# Patient Record
Sex: MALE | Race: OTHER | Employment: UNEMPLOYED | URBAN - METROPOLITAN AREA
[De-identification: names, ages, dates, MRNs, and addresses within clinical notes are randomized per-mention and may not be internally consistent; named-entity substitution may affect disease eponyms.]

---

## 2023-01-27 ENCOUNTER — HOSPITAL ENCOUNTER (INPATIENT)
Facility: HOSPITAL | Age: 88
LOS: 6 days | Discharge: HOME | End: 2023-02-02
Attending: EMERGENCY MEDICINE | Admitting: INTERNAL MEDICINE
Payer: MEDICARE

## 2023-01-27 ENCOUNTER — APPOINTMENT (OUTPATIENT)
Dept: GENERAL RADIOLOGY | Facility: HOSPITAL | Age: 88
End: 2023-01-27
Payer: MEDICARE

## 2023-01-27 DIAGNOSIS — N17.9 AKI (ACUTE KIDNEY INJURY) (HCC): ICD-10-CM

## 2023-01-27 DIAGNOSIS — E87.0 HYPERNATREMIA: ICD-10-CM

## 2023-01-27 DIAGNOSIS — R77.8 ELEVATED TROPONIN: ICD-10-CM

## 2023-01-27 DIAGNOSIS — N30.01 ACUTE CYSTITIS WITH HEMATURIA: ICD-10-CM

## 2023-01-27 DIAGNOSIS — T68.XXXA HYPOTHERMIA, INITIAL ENCOUNTER: Primary | ICD-10-CM

## 2023-01-27 LAB
ADENOVIRUS PCR:: NOT DETECTED
ALBUMIN SERPL-MCNC: 2.5 G/DL (ref 3.4–5)
ALBUMIN/GLOB SERPL: 0.5 {RATIO} (ref 1–2)
ALP LIVER SERPL-CCNC: 82 U/L
ALT SERPL-CCNC: 20 U/L
ANION GAP SERPL CALC-SCNC: 3 MMOL/L (ref 0–18)
APTT PPP: 47.6 SECONDS (ref 23.3–35.6)
AST SERPL-CCNC: 40 U/L (ref 15–37)
B PARAPERT DNA SPEC QL NAA+PROBE: NOT DETECTED
B PERT DNA SPEC QL NAA+PROBE: NOT DETECTED
BASOPHILS # BLD AUTO: 0.02 X10(3) UL (ref 0–0.2)
BASOPHILS NFR BLD AUTO: 0.5 %
BILIRUB SERPL-MCNC: 0.5 MG/DL (ref 0.1–2)
BILIRUB UR QL: NEGATIVE
BUN BLD-MCNC: 53 MG/DL (ref 7–18)
BUN/CREAT SERPL: 20.2 (ref 10–20)
C PNEUM DNA SPEC QL NAA+PROBE: NOT DETECTED
CALCIUM BLD-MCNC: 9.8 MG/DL (ref 8.5–10.1)
CHLORIDE SERPL-SCNC: 123 MMOL/L (ref 98–112)
CHOLEST SERPL-MCNC: 162 MG/DL (ref ?–200)
CLARITY UR: CLEAR
CO2 SERPL-SCNC: 28 MMOL/L (ref 21–32)
COLOR UR: YELLOW
CORONAVIRUS 229E PCR:: NOT DETECTED
CORONAVIRUS HKU1 PCR:: NOT DETECTED
CORONAVIRUS NL63 PCR:: NOT DETECTED
CORONAVIRUS OC43 PCR:: NOT DETECTED
CREAT BLD-MCNC: 2.63 MG/DL
DEPRECATED RDW RBC AUTO: 58.1 FL (ref 35.1–46.3)
EOSINOPHIL # BLD AUTO: 0.33 X10(3) UL (ref 0–0.7)
EOSINOPHIL NFR BLD AUTO: 7.8 %
ERYTHROCYTE [DISTWIDTH] IN BLOOD BY AUTOMATED COUNT: 16.9 % (ref 11–15)
FLUAV + FLUBV RNA SPEC NAA+PROBE: NEGATIVE
FLUAV + FLUBV RNA SPEC NAA+PROBE: NEGATIVE
FLUAV RNA SPEC QL NAA+PROBE: NOT DETECTED
FLUBV RNA SPEC QL NAA+PROBE: NOT DETECTED
GFR SERPLBLD BASED ON 1.73 SQ M-ARVRAT: 23 ML/MIN/1.73M2 (ref 60–?)
GLOBULIN PLAS-MCNC: 4.9 G/DL (ref 2.8–4.4)
GLUCOSE BLD-MCNC: 94 MG/DL (ref 70–99)
GLUCOSE UR-MCNC: NEGATIVE MG/DL
HCT VFR BLD AUTO: 31 %
HDLC SERPL-MCNC: 58 MG/DL (ref 40–59)
HGB BLD-MCNC: 9.5 G/DL
IMM GRANULOCYTES # BLD AUTO: 0.01 X10(3) UL (ref 0–1)
IMM GRANULOCYTES NFR BLD: 0.2 %
INR BLD: 1.19 (ref 0.85–1.16)
KETONES UR-MCNC: NEGATIVE MG/DL
LACTATE SERPL-SCNC: 0.7 MMOL/L (ref 0.4–2)
LDLC SERPL CALC-MCNC: 91 MG/DL (ref ?–100)
LYMPHOCYTES # BLD AUTO: 1.13 X10(3) UL (ref 1–4)
LYMPHOCYTES NFR BLD AUTO: 26.8 %
MCH RBC QN AUTO: 28.8 PG (ref 26–34)
MCHC RBC AUTO-ENTMCNC: 30.6 G/DL (ref 31–37)
MCV RBC AUTO: 93.9 FL
METAPNEUMOVIRUS PCR:: NOT DETECTED
MONOCYTES # BLD AUTO: 0.26 X10(3) UL (ref 0.1–1)
MONOCYTES NFR BLD AUTO: 6.2 %
MYCOPLASMA PNEUMONIA PCR:: NOT DETECTED
NEUTROPHILS # BLD AUTO: 2.47 X10 (3) UL (ref 1.5–7.7)
NEUTROPHILS # BLD AUTO: 2.47 X10(3) UL (ref 1.5–7.7)
NEUTROPHILS NFR BLD AUTO: 58.5 %
NITRITE UR QL STRIP.AUTO: NEGATIVE
NONHDLC SERPL-MCNC: 104 MG/DL (ref ?–130)
OSMOLALITY SERPL CALC.SUM OF ELEC: 332 MOSM/KG (ref 275–295)
PARAINFLUENZA 1 PCR:: NOT DETECTED
PARAINFLUENZA 2 PCR:: NOT DETECTED
PARAINFLUENZA 3 PCR:: NOT DETECTED
PARAINFLUENZA 4 PCR:: NOT DETECTED
PH UR: 7.5 [PH] (ref 5–8)
PLATELET # BLD AUTO: 143 10(3)UL (ref 150–450)
POTASSIUM SERPL-SCNC: 4.4 MMOL/L (ref 3.5–5.1)
PROT SERPL-MCNC: 7.4 G/DL (ref 6.4–8.2)
PROTHROMBIN TIME: 15 SECONDS (ref 11.6–14.8)
RBC # BLD AUTO: 3.3 X10(6)UL
RHINOVIRUS/ENTERO PCR:: NOT DETECTED
RSV RNA SPEC NAA+PROBE: NEGATIVE
RSV RNA SPEC QL NAA+PROBE: NOT DETECTED
SARS-COV-2 RNA NPH QL NAA+NON-PROBE: NOT DETECTED
SARS-COV-2 RNA RESP QL NAA+PROBE: NOT DETECTED
SODIUM SERPL-SCNC: 154 MMOL/L (ref 136–145)
SP GR UR STRIP: 1.01 (ref 1–1.03)
TRIGL SERPL-MCNC: 69 MG/DL (ref 30–149)
TROPONIN I HIGH SENSITIVITY: 391 NG/L
TROPONIN I HIGH SENSITIVITY: 416 NG/L
TSI SER-ACNC: 3.72 MIU/ML (ref 0.36–3.74)
UROBILINOGEN UR STRIP-ACNC: 0.2
VLDLC SERPL CALC-MCNC: 11 MG/DL (ref 0–30)
WBC # BLD AUTO: 4.2 X10(3) UL (ref 4–11)

## 2023-01-27 PROCEDURE — 71045 X-RAY EXAM CHEST 1 VIEW: CPT | Performed by: EMERGENCY MEDICINE

## 2023-01-27 RX ORDER — ACETAMINOPHEN 325 MG/1
650 TABLET ORAL EVERY 4 HOURS PRN
COMMUNITY

## 2023-01-27 RX ORDER — ASPIRIN 81 MG/1
324 TABLET, CHEWABLE ORAL ONCE
Status: DISCONTINUED | OUTPATIENT
Start: 2023-01-27 | End: 2023-01-27

## 2023-01-27 RX ORDER — ASPIRIN 300 MG/1
300 SUPPOSITORY RECTAL ONCE
Status: COMPLETED | OUTPATIENT
Start: 2023-01-27 | End: 2023-01-27

## 2023-01-27 RX ORDER — MELATONIN
100 DAILY
COMMUNITY

## 2023-01-27 RX ORDER — HEPARIN SODIUM AND DEXTROSE 10000; 5 [USP'U]/100ML; G/100ML
INJECTION INTRAVENOUS CONTINUOUS
Status: DISCONTINUED | OUTPATIENT
Start: 2023-01-28 | End: 2023-01-28

## 2023-01-27 RX ORDER — LATANOPROST 50 UG/ML
SOLUTION/ DROPS OPHTHALMIC NIGHTLY
COMMUNITY

## 2023-01-27 RX ORDER — CARVEDILOL 3.12 MG/1
3.12 TABLET ORAL 2 TIMES DAILY WITH MEALS
COMMUNITY
End: 2023-02-02

## 2023-01-27 RX ORDER — DORZOLAMIDE HYDROCHLORIDE AND TIMOLOL MALEATE 20; 5 MG/ML; MG/ML
1 SOLUTION/ DROPS OPHTHALMIC 2 TIMES DAILY
COMMUNITY

## 2023-01-27 RX ORDER — AMOXICILLIN 250 MG
1 CAPSULE ORAL NIGHTLY
COMMUNITY

## 2023-01-27 RX ORDER — HEPARIN SODIUM AND DEXTROSE 10000; 5 [USP'U]/100ML; G/100ML
12 INJECTION INTRAVENOUS ONCE
Status: COMPLETED | OUTPATIENT
Start: 2023-01-27 | End: 2023-01-28

## 2023-01-27 RX ORDER — TAMSULOSIN HYDROCHLORIDE 0.4 MG/1
0.8 CAPSULE ORAL NIGHTLY
COMMUNITY

## 2023-01-27 RX ORDER — MELATONIN
325
COMMUNITY

## 2023-01-27 RX ORDER — HEPARIN SODIUM 1000 [USP'U]/ML
60 INJECTION, SOLUTION INTRAVENOUS; SUBCUTANEOUS ONCE
Status: COMPLETED | OUTPATIENT
Start: 2023-01-27 | End: 2023-01-27

## 2023-01-27 RX ORDER — FINASTERIDE 5 MG/1
5 TABLET, FILM COATED ORAL DAILY
COMMUNITY

## 2023-01-27 NOTE — ED QUICK NOTES
Primo fit placed to obtain urine. Mistral air blanket (38 degrees C) placed to help bring up core temperature.

## 2023-01-27 NOTE — ED INITIAL ASSESSMENT (HPI)
Pt presents from the [facility] for complaints of failure to thrive.     Pt noted to have coffee ground residual to area below nose. +contracted

## 2023-01-28 ENCOUNTER — APPOINTMENT (OUTPATIENT)
Dept: CV DIAGNOSTICS | Facility: HOSPITAL | Age: 88
End: 2023-01-28
Attending: INTERNAL MEDICINE
Payer: MEDICARE

## 2023-01-28 LAB
APTT PPP: 217.8 SECONDS (ref 23.3–35.6)
APTT PPP: 54.2 SECONDS (ref 23.3–35.6)
ATRIAL RATE: 52 BPM
ATRIAL RATE: 69 BPM
CORTIS SERPL-MCNC: 9.2 UG/DL
P AXIS: 15 DEGREES
P AXIS: 69 DEGREES
P-R INTERVAL: 102 MS
P-R INTERVAL: 152 MS
Q-T INTERVAL: 414 MS
Q-T INTERVAL: 476 MS
QRS DURATION: 66 MS
QRS DURATION: 90 MS
QTC CALCULATION (BEZET): 442 MS
QTC CALCULATION (BEZET): 443 MS
R AXIS: 7 DEGREES
R AXIS: 8 DEGREES
T AXIS: 63 DEGREES
T AXIS: 73 DEGREES
VENTRICULAR RATE: 52 BPM
VENTRICULAR RATE: 69 BPM

## 2023-01-28 PROCEDURE — 93306 TTE W/DOPPLER COMPLETE: CPT | Performed by: INTERNAL MEDICINE

## 2023-01-28 RX ORDER — DORZOLAMIDE HYDROCHLORIDE AND TIMOLOL MALEATE 20; 5 MG/ML; MG/ML
1 SOLUTION/ DROPS OPHTHALMIC 2 TIMES DAILY
Status: DISCONTINUED | OUTPATIENT
Start: 2023-01-28 | End: 2023-02-02

## 2023-01-28 RX ORDER — TAMSULOSIN HYDROCHLORIDE 0.4 MG/1
0.8 CAPSULE ORAL NIGHTLY
Status: DISCONTINUED | OUTPATIENT
Start: 2023-01-28 | End: 2023-02-02

## 2023-01-28 RX ORDER — MELATONIN
100 DAILY
Status: DISCONTINUED | OUTPATIENT
Start: 2023-01-28 | End: 2023-02-02

## 2023-01-28 RX ORDER — DEXTROSE AND SODIUM CHLORIDE 5; .45 G/100ML; G/100ML
INJECTION, SOLUTION INTRAVENOUS CONTINUOUS
Status: DISCONTINUED | OUTPATIENT
Start: 2023-01-28 | End: 2023-01-29

## 2023-01-28 RX ORDER — MELATONIN
2000 DAILY
Status: DISCONTINUED | OUTPATIENT
Start: 2023-01-28 | End: 2023-02-02

## 2023-01-28 RX ORDER — LATANOPROST 50 UG/ML
1 SOLUTION/ DROPS OPHTHALMIC NIGHTLY
Status: DISCONTINUED | OUTPATIENT
Start: 2023-01-28 | End: 2023-02-02

## 2023-01-28 RX ORDER — ACETAMINOPHEN 325 MG/1
650 TABLET ORAL EVERY 4 HOURS PRN
Status: DISCONTINUED | OUTPATIENT
Start: 2023-01-28 | End: 2023-02-02

## 2023-01-28 RX ORDER — MELATONIN
325
Status: DISCONTINUED | OUTPATIENT
Start: 2023-01-28 | End: 2023-02-02

## 2023-01-28 RX ORDER — FINASTERIDE 5 MG/1
5 TABLET, FILM COATED ORAL DAILY
Status: DISCONTINUED | OUTPATIENT
Start: 2023-01-28 | End: 2023-02-02

## 2023-01-28 RX ORDER — SENNA AND DOCUSATE SODIUM 50; 8.6 MG/1; MG/1
1 TABLET, FILM COATED ORAL NIGHTLY
Status: DISCONTINUED | OUTPATIENT
Start: 2023-01-28 | End: 2023-02-02

## 2023-01-28 NOTE — PLAN OF CARE
Notified by Er provider: new consult  Elevated trop, on heparin gtt. Awaiting second trop. EKG, labs reviewed.     Full consult to follow

## 2023-01-28 NOTE — PROGRESS NOTES
79 y/o male hx dementia  presents from the [facility] with failure to thrive, hypothermia temp 90.2, troponin 416. Now warmed to 97. EKG has J point elevation secondary to hypothermia. History unobtainable. Would check thyroid and cortisol. Correct BUN 53, creat 2.6, Na 154 with fluids. HgB 9.4, may decrease as patient hydrated. Echo today. Doubt primary cardiac event.

## 2023-01-28 NOTE — PHYSICAL THERAPY NOTE
PT order received, chart review complete. Pt seen sleeping in bed with [employer]. Attempted to wake pt however pt remained lethargic, non-verbal, not following command, and kept eyes closed. Will follow up tomorrow for PT evaluation as appropriate and able. RN was made aware.

## 2023-01-28 NOTE — ED QUICK NOTES
This RN and PCT transported patient to floor. Handoff given at the bedside. Family at bedside. Brought to floor with belongings. No issues during transport.

## 2023-01-28 NOTE — CONSULTS
[facility]    PATIENT'S NAME: Madhavi Jenkins   ATTENDING PHYSICIAN: Augustine Ferrell MD   CONSULTING PHYSICIAN: Franco Zamora MD   PATIENT ACCOUNT#:   [de-identified]    LOCATION:  [address] [place], [place] [address] #:   [id number]       YOB: 1933  ADMISSION DATE:       01/27/2023      CONSULT DATE:  01/28/2023    REPORT OF CONSULTATION    HISTORY OF PRESENT ILLNESS:  An 51-year-old male who was brought from the [facility] with failure to thrive. It almost impossible to get a history. He was brought to the emergency room, temperature was 90. Patient was then warmed, he is now 95. In the emergency room, he had an EKG that had J-point elevation with elevated troponin, BUN, creatinine, and sodium. Most of the history is obtained via the chart. There is a question of he had eaten yesterday. Patient arrives nonverbal.      PAST MEDICAL HISTORY:  Not obtainable. SOCIAL HISTORY:  Patient comes from the [facility]. REVIEW OF SYSTEMS:  Not obtainable. PHYSICAL EXAMINATION:    VITAL SIGNS:  Blood pressure 130/70; pulse 60; initial temperature is 90.2, now up to 97. HEENT:  Pupils reactive. LUNGS:  Rhonchi bilaterally. HEART:  S1, S2. Systolic murmur. ABDOMEN:  Soft. EXTREMITIES:  No clubbing, cyanosis, or edema. NEUROLOGIC:  Does not really respond. LABORATORY DATA:  Troponin 416. Thyroid normal.  Hemoglobin 9.5. EKG:  J-point. IMPRESSION:    1. Hypothermia. At the present time, we will check serum cortisol. His thyroid function is already normal.  Evaluate for any sepsis. Patient is warm without any arrhythmia. His lactic acid on admission was normal.  2.   Elevated troponin. This is most likely could be demand ischemia. We will get echo. Doubt a primary cardiac event. 3.   Anemia. Transfuse as needed. 4.   Chronic kidney disease with elevated BUN and creatinine. We will expect this to improve with fluid resuscitation.     Dictated By Franco Zamora, MD  d: 01/28/2023 05:25:31  t: 01/28/2023 05:28:07  [facility] [id number]  MJG/

## 2023-01-28 NOTE — PROGRESS NOTES
Rec'd patient from ED. Heparin gtt infusing @ 700units/hr. Next PTT @ 0400 as per protocol. Pt able to follow commands and mumbling speech. Temp noted to be 97.4. Feet very dry/flaky/peeling. Cleansed and lotion applied. Conda Danas remains in place. Family @ bedside and all questions answered as able.

## 2023-01-28 NOTE — ED QUICK NOTES
Pt's right upper arm piv noted to have infiltrated s/p ns bolus. Enlargement of right elbow/upper forearm/upper arm. No crepitus noted. Pt denies any acute pain or distress to that extremity. Edmd informed.

## 2023-01-29 LAB
ANION GAP SERPL CALC-SCNC: 1 MMOL/L (ref 0–18)
BUN BLD-MCNC: 60 MG/DL (ref 7–18)
BUN/CREAT SERPL: 18 (ref 10–20)
CALCIUM BLD-MCNC: 8.8 MG/DL (ref 8.5–10.1)
CHLORIDE SERPL-SCNC: 131 MMOL/L (ref 98–112)
CO2 SERPL-SCNC: 26 MMOL/L (ref 21–32)
CREAT BLD-MCNC: 3.33 MG/DL
DEPRECATED RDW RBC AUTO: 57.1 FL (ref 35.1–46.3)
ERYTHROCYTE [DISTWIDTH] IN BLOOD BY AUTOMATED COUNT: 16.8 % (ref 11–15)
GFR SERPLBLD BASED ON 1.73 SQ M-ARVRAT: 17 ML/MIN/1.73M2 (ref 60–?)
GLUCOSE BLD-MCNC: 115 MG/DL (ref 70–99)
HCT VFR BLD AUTO: 27.4 %
HGB BLD-MCNC: 8.4 G/DL
MCH RBC QN AUTO: 28.6 PG (ref 26–34)
MCHC RBC AUTO-ENTMCNC: 30.7 G/DL (ref 31–37)
MCV RBC AUTO: 93.2 FL
OSMOLALITY SERPL CALC.SUM OF ELEC: 344 MOSM/KG (ref 275–295)
PLATELET # BLD AUTO: 172 10(3)UL (ref 150–450)
POTASSIUM SERPL-SCNC: 5 MMOL/L (ref 3.5–5.1)
RBC # BLD AUTO: 2.94 X10(6)UL
SODIUM SERPL-SCNC: 158 MMOL/L (ref 136–145)
WBC # BLD AUTO: 5.6 X10(3) UL (ref 4–11)

## 2023-01-29 PROCEDURE — 99223 1ST HOSP IP/OBS HIGH 75: CPT | Performed by: INTERNAL MEDICINE

## 2023-01-29 RX ORDER — DEXTROSE MONOHYDRATE 50 MG/ML
INJECTION, SOLUTION INTRAVENOUS CONTINUOUS
Status: DISCONTINUED | OUTPATIENT
Start: 2023-01-29 | End: 2023-02-02

## 2023-01-29 NOTE — OCCUPATIONAL THERAPY NOTE
Occupational therapy orders received via functional mobility screening. Rn approved pt participation on thsi date. Pt children in room, reporting pt has been at [facility] since December 2022. Pt typically self feeds, pedals self around in w/c, toilet hygiene in bed level, has had several falls since living there. Pt asleep and difficult to arouse on this date, attempted sternal rub, extra lighting in room. Not appropriate for evaluation this morning. Will continue to follow.      Raquel Jasso OTR/L  [id number]  [facility]

## 2023-01-29 NOTE — PHYSICAL THERAPY NOTE
PT orders received via \"functional mobility screen\". RN cleared pt to participate in PT evaluation this morning. Pt received in bed at start, family at bedside. Family reports pt has been residing at [facility] since Dec 2022. Pt able to self propel wc at baseline and receives assist for transfers. Notes several falls since his admission there. Pt asleep at start, difficult to arouse. Attempted sternal rub and increased lighting but no change. Not appropriate for out of bed mobility with this level of alertness. Will plan to follow up when pt able to participate safely.

## 2023-01-29 NOTE — CM/SW NOTE
TRACY received a phone call from [address]. [phone]. Aileen Hair informed that she is calling in regards to [facility] eligibility decision for emergent care, which has been approved for payment. Referral ID  [id number], Notification ID [id number]. TRACY received fax from Aileen Hair  and will send attachment, via email  to [facility] and [facility]. SW/BLUE to remain available for support and/or discharge planning.      Chapo Nagy, MSW, LSW   x [phone]

## 2023-01-30 ENCOUNTER — APPOINTMENT (OUTPATIENT)
Dept: ULTRASOUND IMAGING | Facility: HOSPITAL | Age: 88
End: 2023-01-30
Attending: INTERNAL MEDICINE
Payer: MEDICARE

## 2023-01-30 LAB
ANION GAP SERPL CALC-SCNC: 2 MMOL/L (ref 0–18)
BASOPHILS # BLD AUTO: 0.02 X10(3) UL (ref 0–0.2)
BASOPHILS # BLD AUTO: 0.02 X10(3) UL (ref 0–0.2)
BASOPHILS NFR BLD AUTO: 0.4 %
BASOPHILS NFR BLD AUTO: 0.4 %
BUN BLD-MCNC: 54 MG/DL (ref 7–18)
BUN/CREAT SERPL: 19.9 (ref 10–20)
CALCIUM BLD-MCNC: 8.8 MG/DL (ref 8.5–10.1)
CHLORIDE SERPL-SCNC: 122 MMOL/L (ref 98–112)
CO2 SERPL-SCNC: 28 MMOL/L (ref 21–32)
CREAT BLD-MCNC: 2.72 MG/DL
DEPRECATED RDW RBC AUTO: 57.1 FL (ref 35.1–46.3)
DEPRECATED RDW RBC AUTO: 57.5 FL (ref 35.1–46.3)
EOSINOPHIL # BLD AUTO: 0.3 X10(3) UL (ref 0–0.7)
EOSINOPHIL # BLD AUTO: 0.31 X10(3) UL (ref 0–0.7)
EOSINOPHIL NFR BLD AUTO: 5.8 %
EOSINOPHIL NFR BLD AUTO: 6.1 %
ERYTHROCYTE [DISTWIDTH] IN BLOOD BY AUTOMATED COUNT: 16.3 % (ref 11–15)
ERYTHROCYTE [DISTWIDTH] IN BLOOD BY AUTOMATED COUNT: 16.3 % (ref 11–15)
GFR SERPLBLD BASED ON 1.73 SQ M-ARVRAT: 22 ML/MIN/1.73M2 (ref 60–?)
GLUCOSE BLD-MCNC: 109 MG/DL (ref 70–99)
HCT VFR BLD AUTO: 23.4 %
HCT VFR BLD AUTO: 26.7 %
HGB BLD-MCNC: 7 G/DL
HGB BLD-MCNC: 8.1 G/DL
IMM GRANULOCYTES # BLD AUTO: 0.02 X10(3) UL (ref 0–1)
IMM GRANULOCYTES # BLD AUTO: 0.03 X10(3) UL (ref 0–1)
IMM GRANULOCYTES NFR BLD: 0.4 %
IMM GRANULOCYTES NFR BLD: 0.6 %
LYMPHOCYTES # BLD AUTO: 0.98 X10(3) UL (ref 1–4)
LYMPHOCYTES # BLD AUTO: 1.06 X10(3) UL (ref 1–4)
LYMPHOCYTES NFR BLD AUTO: 19.8 %
LYMPHOCYTES NFR BLD AUTO: 20 %
MCH RBC QN AUTO: 28.3 PG (ref 26–34)
MCH RBC QN AUTO: 28.6 PG (ref 26–34)
MCHC RBC AUTO-ENTMCNC: 29.9 G/DL (ref 31–37)
MCHC RBC AUTO-ENTMCNC: 30.3 G/DL (ref 31–37)
MCV RBC AUTO: 94.3 FL
MCV RBC AUTO: 94.7 FL
MONOCYTES # BLD AUTO: 0.28 X10(3) UL (ref 0.1–1)
MONOCYTES # BLD AUTO: 0.31 X10(3) UL (ref 0.1–1)
MONOCYTES NFR BLD AUTO: 5.7 %
MONOCYTES NFR BLD AUTO: 5.8 %
NEUTROPHILS # BLD AUTO: 3.3 X10 (3) UL (ref 1.5–7.7)
NEUTROPHILS # BLD AUTO: 3.3 X10(3) UL (ref 1.5–7.7)
NEUTROPHILS # BLD AUTO: 3.63 X10 (3) UL (ref 1.5–7.7)
NEUTROPHILS # BLD AUTO: 3.63 X10(3) UL (ref 1.5–7.7)
NEUTROPHILS NFR BLD AUTO: 67.2 %
NEUTROPHILS NFR BLD AUTO: 67.8 %
OSMOLALITY SERPL CALC.SUM OF ELEC: 329 MOSM/KG (ref 275–295)
PLATELET # BLD AUTO: 123 10(3)UL (ref 150–450)
PLATELET # BLD AUTO: 136 10(3)UL (ref 150–450)
POTASSIUM SERPL-SCNC: 5 MMOL/L (ref 3.5–5.1)
RBC # BLD AUTO: 2.47 X10(6)UL
RBC # BLD AUTO: 2.83 X10(6)UL
SODIUM SERPL-SCNC: 152 MMOL/L (ref 136–145)
WBC # BLD AUTO: 4.9 X10(3) UL (ref 4–11)
WBC # BLD AUTO: 5.4 X10(3) UL (ref 4–11)

## 2023-01-30 PROCEDURE — 76770 US EXAM ABDO BACK WALL COMP: CPT | Performed by: INTERNAL MEDICINE

## 2023-01-30 PROCEDURE — 99233 SBSQ HOSP IP/OBS HIGH 50: CPT | Performed by: INTERNAL MEDICINE

## 2023-01-30 NOTE — CM/SW NOTE
Per chart review, pt is from [address]. Sw received MDO stating pt will likely be ready to return today. SW sent return referral.     PLAN: pending medical course - return to [facility] remains available for support and/or discharge planning. Please do not hesitate to call/chat SW if further DC needs arise.      Gurwinder Richardson MSW, [place], [place]   Ext [phone]

## 2023-01-30 NOTE — PHYSICAL THERAPY NOTE
Pt seen resting in bed with dtr present in room. Pt declined participation with therapy despite education and encouragement. Per pt's dtr, pt is WC bound and dependent for transfers bed<>WC. Able to self propel in [facility] but does not receive therapy at [facility] due to dementia and being uncooperative with therapists. Pt's dtr stated the plan is to take pt home to family's house where he will have 24 hour care. Will follow up as appropriate and able.

## 2023-01-30 NOTE — CM/SW NOTE
TRACY met with pt's 2 daughters at bedside: Janice and Effie Oskar. Pt presents from the [facility]. Per Keenan Polanco, pt admitted to [facility] on 12/7/2022 from [facility]. Daughters confirmed this. Daughters noted that pt is primarily w/c bound and requires assistance for transfers. Pt's daughters report that pt had an extensive hospitalization at [facility] from October 2022 until he discharged to [facility] in the beginning of December 2022. Daughters report that pt was living at home with family prior to pt's hospitalization in October 2022. Family does not wish for pt to return to the [facility]. They were not pleased with the care received during his stay. Daughters would like to make arrangements to bring pt to their brother Praneeth's home:  [address]    Family report they will provide 24h care for pt. Pt will be able to remain on 1st level of son's home. Family interested in home health and DME. TRACY called pt's Yolanda  p: [phone]. TRACY left VM. Carle Denver is currently out of the office (until unknown). Her VM [facility] [facility]) p: [phone]. TRACY left  for [place]. @3PM  TRACY received call back from Nicanor Torres. Per Carle Denver - she is not pt's [address] states she will \"do some research\" to find out who his [facility] SW is and call this writer back. Carle Denver states she will likely not call writer back until tomorrow 1/31    PLAN: pending medical course & [facility] home health/DME arrangements - family would like to bring pt home  - declining return to [address] [place] [address] remains available for support and/or discharge planning. Please do not hesitate to call/chat TRACY if further DC needs arise.      Nisreen Alfred [place], [place]   Ext [phone]

## 2023-01-30 NOTE — CM/SW NOTE
Department  notified of request for [facility] referrals started. Assigned CM/SW to follow up with pt/family on further discharge planning.      Yohana Chowdary   January 30, 2023   09:32

## 2023-01-30 NOTE — OCCUPATIONAL THERAPY NOTE
Attempted OT evaluation. Per daughter, pt is w/c bound at [facility] requiring dependent assist for ADLs and functional transfers due to dementia. Pt does not receive therapy services as he is uncooperative. Pt declined OT/PT attempt and daughter requested therapy to return at a later time.     Gabriella Quarles, OT

## 2023-01-31 LAB
ANION GAP SERPL CALC-SCNC: 0 MMOL/L (ref 0–18)
BUN BLD-MCNC: 57 MG/DL (ref 7–18)
BUN/CREAT SERPL: 23.5 (ref 10–20)
CALCIUM BLD-MCNC: 8.6 MG/DL (ref 8.5–10.1)
CHLORIDE SERPL-SCNC: 117 MMOL/L (ref 98–112)
CO2 SERPL-SCNC: 26 MMOL/L (ref 21–32)
CREAT BLD-MCNC: 2.43 MG/DL
CREAT UR-SCNC: 55.9 MG/DL
CREAT UR-SCNC: 55.9 MG/DL
DEPRECATED RDW RBC AUTO: 53.2 FL (ref 35.1–46.3)
ERYTHROCYTE [DISTWIDTH] IN BLOOD BY AUTOMATED COUNT: 15.9 % (ref 11–15)
GFR SERPLBLD BASED ON 1.73 SQ M-ARVRAT: 25 ML/MIN/1.73M2 (ref 60–?)
GLUCOSE BLD-MCNC: 73 MG/DL (ref 70–99)
HCT VFR BLD AUTO: 25.8 %
HGB BLD-MCNC: 8.1 G/DL
IRON SATN MFR SERPL: 32 %
IRON SERPL-MCNC: 56 UG/DL
MAGNESIUM SERPL-MCNC: 2 MG/DL (ref 1.6–2.6)
MCH RBC QN AUTO: 28.7 PG (ref 26–34)
MCHC RBC AUTO-ENTMCNC: 31.4 G/DL (ref 31–37)
MCV RBC AUTO: 91.5 FL
MICROALBUMIN UR-MCNC: 13.7 MG/DL
MICROALBUMIN/CREAT 24H UR-RTO: 245.1 UG/MG (ref ?–30)
OSMOLALITY SERPL CALC.SUM OF ELEC: 310 MOSM/KG (ref 275–295)
PLATELET # BLD AUTO: 147 10(3)UL (ref 150–450)
POTASSIUM SERPL-SCNC: 5.1 MMOL/L (ref 3.5–5.1)
RBC # BLD AUTO: 2.82 X10(6)UL
SODIUM SERPL-SCNC: 143 MMOL/L (ref 136–145)
SODIUM SERPL-SCNC: 52 MMOL/L
TIBC SERPL-MCNC: 174 UG/DL (ref 240–450)
TRANSFERRIN SERPL-MCNC: 117 MG/DL (ref 200–360)
WBC # BLD AUTO: 6.1 X10(3) UL (ref 4–11)

## 2023-01-31 PROCEDURE — 99233 SBSQ HOSP IP/OBS HIGH 50: CPT | Performed by: INTERNAL MEDICINE

## 2023-01-31 RX ORDER — LIDOCAINE HYDROCHLORIDE 20 MG/ML
10 JELLY TOPICAL AS NEEDED
Status: DISCONTINUED | OUTPATIENT
Start: 2023-01-31 | End: 2023-02-02

## 2023-01-31 RX ORDER — CEFTRIAXONE 1 G/1
1 INJECTION, POWDER, FOR SOLUTION INTRAMUSCULAR; INTRAVENOUS EVERY 24 HOURS
Status: DISCONTINUED | OUTPATIENT
Start: 2023-01-31 | End: 2023-01-31

## 2023-01-31 NOTE — CM/SW NOTE
Department  notified of request for DME, aidin referrals started. Assigned CM/SW to follow up with pt/family on further discharge planning.      Ellie Nath   January 31, 2023   09:44

## 2023-01-31 NOTE — PROGRESS NOTES
[facility]    Progress Note    David Merrill Patient Status:  Inpatient    1933 MRN [id number]   Location [facility] 2W/SW Attending Tess Freitas MD   Hosp Day # 3 PCP No primary care provider on file. Subjective:    David Merrill is a(n) 80year old male     ROS:     Constitutional:  Negative for decreased activity, fever, irritability and lethargy  ENMT:  Negative for ear drainage, hearing loss and nasal drainage  Eyes:  Negative for eye discharge and vision loss  Cardiovascular:  Negative for chest pain, sob, irregular heartbeat/palpitations  Respiratory:  Negative for cough, dyspnea and wheezing  Gastrointestinal:  Negative for abdominal pain, constipation, decreased appetite, diarrhea and vomiting  Genitourinary:  Negative for dysuria and hematuria  Endocrine:  Negative for abnormal sleep patterns, increased activity, polydipsia and polyphagia  Hema/Lymph:  Negative for easy bleeding and easy bruising  Integumentary:  Negative for pruritus and rash  Musculoskeletal:  Negative for bone/joint symptoms  Neurological: More alert today  Psychiatric:  Negative for inappropriate interaction and psychiatric symptoms       23  1200   BP: 123/64   Pulse: 66   Resp: 16   Temp:            PHYSICAL EXAM:   Constitutional: appears well hydrated alert and responsive no acute distress noted  Head/Face: normocephalic  Eyes/Vision: normal extraocular motion is intact  Nose/Mouth/Throat:mucous membranes are moist and no oral lesions are noted  Neck/Thyroid: neck is supple without adenopathy  Lymphatic: no abnormal cervical, supraclavicular adenopathy is noted  Respiratory:  lungs are clear to auscultation bilaterally, normal respiratory effort  Cardiovascular: regular rate and rhythm no murmurs, gallups, or rubs  Abdomen: soft, non-tender, non-distended, BS normal  Vascular: well perfused femoral, and pedal pulses normal  Skin/Hair: no unusual rashes present, no abnormal bruising noted  Back/Spine: no abnormalities noted  Musculoskeletal: full ROM all extremities good strength  no deformities  Extremities: Very dry skin poor circulation lower extremities  Neurological: Has dementia but able to answer basic questions    Results:     Laboratory Data:  Lab Results   Component Value Date    WBC 5.4 01/30/2023    HGB 8.1 (L) 01/30/2023    HCT 26.7 (L) 01/30/2023    .0 (L) 01/30/2023    CREATSERUM 2.72 (H) 01/30/2023    BUN 54 (H) 01/30/2023     (H) 01/30/2023    K 5.0 01/30/2023     (H) 01/30/2023    CO2 28.0 01/30/2023     (H) 01/30/2023    CA 8.8 01/30/2023    ALB 2.5 (L) 01/27/2023    ALKPHO 82 01/27/2023    BILT 0.5 01/27/2023    TP 7.4 01/27/2023    AST 40 (H) 01/27/2023    ALT 20 01/27/2023    PTT 54.2 (H) 01/28/2023    INR 1.19 (H) 01/27/2023    TSH 3.720 01/27/2023       Imaging:  [unfilled]   No results found. ASSESSMENT/PLAN:   Assessment       #1 acute renal failure volume depletion  Doing better  Cultures negative    Getting hydrated with D5W sodium has come down   From 160-152  Creatinine down to 2.7 do not really know baseline    Hemoglobin 8.1 most likely multifactorial but will check iron and B12 MCV of 94    Discussed with daughters they do not want him going back to the [facility] so they are looking for other nursing home options  Not a candidate for dialysis continue present plan discussed with nurse Liset Santos  1/30/2023  Alberta Mooney MD    '

## 2023-01-31 NOTE — CM/SW NOTE
HOSPITAL BED ORDER  SW requested MD to co-sign hospital bed order placed by writer this morning. MD will also need to complete the below verbiage in a brief note/progress note:    On (date) I had a face to face encounter with (First, Last Name) for a semi electric hospital bed medical necessity evaluation. Patient has a history of (diagnosis) which would require him/her to have to elevate his/her head 30 degrees or more in order to avoid (symptoms). Patient is bed bound and is not able to reposition him/her self and needs the bed to alleviate pain in his/her (body part). It is in my opinion that a semi-electric hospital bed is reasonable and necessary.       DME referral requested to [facility]  delivery address: [place] [address]. GURMEET, [place], [place]   Ext [phone]

## 2023-01-31 NOTE — CM/SW NOTE
TRACY received call from [address] D:[phone]. TRACY discussed pt's needs for DC. Hung Leone confirmed that he will work on home health aid arrangements for pt. TRACY provided DC address to Hung Leone. William notified TRACY to send [facility] orders to [place]. TRACY faxed clinicals and [facility] request to [place] MD F:[phone] attn Dr. Zain Araujo. Hung Leone reports he will notify VA MD of incoming [facility] order request. Hung Leone directed TRACY to arrange DME thru pt's medicare, as this will be more efficient. TRACY confirmed with pt's dtr dme needs: hospital bed. Hospital bed order details in previous note. HH to be arranged thru [place] as pt's PCP is [place]. PLAN: pending medical course - DC to home to son's house: [address]: pending with VA, orders sent  - need confirmation of arrangements for PT/OT/RN  - William/XIN MOLINA will arrange for [facility] aid    DME: referral started with [facility] (hospital bed)   - need order to be co-signed & MD to enter appropriate verbiage. verbiage sent to MD MOLINA remains available for support and/or discharge planning. Please do not hesitate to call/chat TRACY if further DC needs arise.      Johann LEVI, [place], [place]   Ext [phone]

## 2023-02-01 LAB
ALBUMIN SERPL-MCNC: 2 G/DL (ref 3.4–5)
ALBUMIN/GLOB SERPL: 0.5 {RATIO} (ref 1–2)
ALP LIVER SERPL-CCNC: 72 U/L
ALT SERPL-CCNC: 28 U/L
ANION GAP SERPL CALC-SCNC: 4 MMOL/L (ref 0–18)
AST SERPL-CCNC: 48 U/L (ref 15–37)
BASOPHILS # BLD AUTO: 0.02 X10(3) UL (ref 0–0.2)
BASOPHILS NFR BLD AUTO: 0.4 %
BILIRUB SERPL-MCNC: 0.2 MG/DL (ref 0.1–2)
BUN BLD-MCNC: 58 MG/DL (ref 7–18)
BUN/CREAT SERPL: 25.6 (ref 10–20)
CALCIUM BLD-MCNC: 8.2 MG/DL (ref 8.5–10.1)
CHLORIDE SERPL-SCNC: 114 MMOL/L (ref 98–112)
CO2 SERPL-SCNC: 25 MMOL/L (ref 21–32)
CREAT BLD-MCNC: 2.27 MG/DL
DEPRECATED RDW RBC AUTO: 51.4 FL (ref 35.1–46.3)
EOSINOPHIL # BLD AUTO: 0.33 X10(3) UL (ref 0–0.7)
EOSINOPHIL NFR BLD AUTO: 5.9 %
ERYTHROCYTE [DISTWIDTH] IN BLOOD BY AUTOMATED COUNT: 15.6 % (ref 11–15)
GFR SERPLBLD BASED ON 1.73 SQ M-ARVRAT: 27 ML/MIN/1.73M2 (ref 60–?)
GLOBULIN PLAS-MCNC: 4.2 G/DL (ref 2.8–4.4)
GLUCOSE BLD-MCNC: 74 MG/DL (ref 70–99)
HCT VFR BLD AUTO: 24.5 %
HGB BLD-MCNC: 7.8 G/DL
IMM GRANULOCYTES # BLD AUTO: 0.02 X10(3) UL (ref 0–1)
IMM GRANULOCYTES NFR BLD: 0.4 %
LYMPHOCYTES # BLD AUTO: 0.93 X10(3) UL (ref 1–4)
LYMPHOCYTES NFR BLD AUTO: 16.5 %
MAGNESIUM SERPL-MCNC: 2 MG/DL (ref 1.6–2.6)
MCH RBC QN AUTO: 28.7 PG (ref 26–34)
MCHC RBC AUTO-ENTMCNC: 31.8 G/DL (ref 31–37)
MCV RBC AUTO: 90.1 FL
MONOCYTES # BLD AUTO: 0.38 X10(3) UL (ref 0.1–1)
MONOCYTES NFR BLD AUTO: 6.8 %
NEUTROPHILS # BLD AUTO: 3.94 X10 (3) UL (ref 1.5–7.7)
NEUTROPHILS # BLD AUTO: 3.94 X10(3) UL (ref 1.5–7.7)
NEUTROPHILS NFR BLD AUTO: 70 %
OSMOLALITY SERPL CALC.SUM OF ELEC: 311 MOSM/KG (ref 275–295)
PHOSPHATE SERPL-MCNC: 3.1 MG/DL (ref 2.5–4.9)
PLATELET # BLD AUTO: 142 10(3)UL (ref 150–450)
POTASSIUM SERPL-SCNC: 4.7 MMOL/L (ref 3.5–5.1)
PROT SERPL-MCNC: 6.2 G/DL (ref 6.4–8.2)
RBC # BLD AUTO: 2.72 X10(6)UL
SODIUM SERPL-SCNC: 143 MMOL/L (ref 136–145)
WBC # BLD AUTO: 5.6 X10(3) UL (ref 4–11)

## 2023-02-01 NOTE — DISCHARGE INSTRUCTIONS
Urethral stricture dilated by urology, indwelling catheter placed on 1/31/2022- urine purulent.      VA Outpatient : Bobbi - O:[phone]

## 2023-02-01 NOTE — CDS QUERY
.Potential for Impaired Nutritional Status  DOCUMENTATION CLARIFICATION FORM    A Registered Dietician evaluated this patient and found them to meet Severe Malnutrition Criteria using the [place] Guidelines based off the following clinical indicators:      * BMI 19.72  * Chronic illness related to weight loss greater than 20% in 1 year  * Per physical exam patient noted with severe body fat depletion to orbital, triceps and rib cage region  * Per physical exam patient noted with severe muscle mass depletion to temple, clavicle, scapular, shoulder and thigh region. * Skin breakdown to sacrum and buttocks  * RD recommends adding ensure compact and magic cup chocolate BID. Patient already on iron, thiamin and vitamin D PTA  * Medical History includes CKD, bph, Dementia    Dear Doctor: Cyndy Lucio information suggests potential for impaired nutritional status. For accurate ICD-10-CM code assignment to reflect severity of illness and risk of mortality,  PLEASE (X) ALL DIAGNOSES THAT APPLY. SELECTION BY PHYSICIAN ONLY    ( x)  Severe Protein-Calorie Malnutrition    ( )  Undernutrition    ( )  Other (please specify):     ( )  Unable to Determine (please comment)          If you have any questions, please contact Clinical :  Mark Keane RN at [phone]     Thank You!     THIS FORM IS A PERMANENT PART OF THE MEDICAL RECORD

## 2023-02-02 VITALS
SYSTOLIC BLOOD PRESSURE: 116 MMHG | RESPIRATION RATE: 15 BRPM | OXYGEN SATURATION: 100 % | TEMPERATURE: 97 F | BODY MASS INDEX: 19.76 KG/M2 | HEART RATE: 66 BPM | HEIGHT: 67 IN | WEIGHT: 125.88 LBS | DIASTOLIC BLOOD PRESSURE: 66 MMHG

## 2023-02-02 LAB
ALBUMIN SERPL-MCNC: 2 G/DL (ref 3.4–5)
ANION GAP SERPL CALC-SCNC: 3 MMOL/L (ref 0–18)
BASOPHILS # BLD AUTO: 0.01 X10(3) UL (ref 0–0.2)
BASOPHILS NFR BLD AUTO: 0.2 %
BUN BLD-MCNC: 52 MG/DL (ref 7–18)
BUN/CREAT SERPL: 24.1 (ref 10–20)
CALCIUM BLD-MCNC: 8.1 MG/DL (ref 8.5–10.1)
CHLORIDE SERPL-SCNC: 116 MMOL/L (ref 98–112)
CO2 SERPL-SCNC: 25 MMOL/L (ref 21–32)
CREAT BLD-MCNC: 2.16 MG/DL
DEPRECATED HBV CORE AB SER IA-ACNC: 847.6 NG/ML
DEPRECATED RDW RBC AUTO: 49.8 FL (ref 35.1–46.3)
EOSINOPHIL # BLD AUTO: 0.25 X10(3) UL (ref 0–0.7)
EOSINOPHIL NFR BLD AUTO: 5.4 %
ERYTHROCYTE [DISTWIDTH] IN BLOOD BY AUTOMATED COUNT: 15.8 % (ref 11–15)
GFR SERPLBLD BASED ON 1.73 SQ M-ARVRAT: 29 ML/MIN/1.73M2 (ref 60–?)
GLUCOSE BLD-MCNC: 83 MG/DL (ref 70–99)
HCT VFR BLD AUTO: 24.3 %
HGB BLD-MCNC: 7.8 G/DL
IMM GRANULOCYTES # BLD AUTO: 0.02 X10(3) UL (ref 0–1)
IMM GRANULOCYTES NFR BLD: 0.4 %
LYMPHOCYTES # BLD AUTO: 0.92 X10(3) UL (ref 1–4)
LYMPHOCYTES NFR BLD AUTO: 19.9 %
MCH RBC QN AUTO: 28.4 PG (ref 26–34)
MCHC RBC AUTO-ENTMCNC: 32.1 G/DL (ref 31–37)
MCV RBC AUTO: 88.4 FL
MONOCYTES # BLD AUTO: 0.33 X10(3) UL (ref 0.1–1)
MONOCYTES NFR BLD AUTO: 7.1 %
NEUTROPHILS # BLD AUTO: 3.09 X10 (3) UL (ref 1.5–7.7)
NEUTROPHILS # BLD AUTO: 3.09 X10(3) UL (ref 1.5–7.7)
NEUTROPHILS NFR BLD AUTO: 67 %
OSMOLALITY SERPL CALC.SUM OF ELEC: 311 MOSM/KG (ref 275–295)
PHOSPHATE SERPL-MCNC: 2.8 MG/DL (ref 2.5–4.9)
PLATELET # BLD AUTO: 160 10(3)UL (ref 150–450)
POTASSIUM SERPL-SCNC: 4.5 MMOL/L (ref 3.5–5.1)
RBC # BLD AUTO: 2.75 X10(6)UL
SODIUM SERPL-SCNC: 144 MMOL/L (ref 136–145)
WBC # BLD AUTO: 4.6 X10(3) UL (ref 4–11)

## 2023-02-02 RX ORDER — MIDODRINE HYDROCHLORIDE 5 MG/1
5 TABLET ORAL 3 TIMES DAILY
Status: DISCONTINUED | OUTPATIENT
Start: 2023-02-02 | End: 2023-02-02

## 2023-02-02 RX ORDER — MIDODRINE HYDROCHLORIDE 5 MG/1
5 TABLET ORAL 3 TIMES DAILY
Qty: 60 TABLET | Refills: 0 | Status: SHIPPED | OUTPATIENT
Start: 2023-02-02

## 2023-02-02 NOTE — PROGRESS NOTES
02/02/23 5989   Wound 01/27/23 Pressure Injury Sacrum   Date First Assessed/Time First Assessed: 01/27/23 2300   Present on Hospital Admission: Yes  Primary Wound Type: Pressure Injury  Location: Sacrum   Wound Image    Site Assessment Bleeding;Fragile; Moist;Red;Granulation tissue   Closure Not approximated   Drainage Amount Scant   Drainage Description Sanguineous   Treatments Cleansed; Topical (Barrier/Moisturizer/Ointment)   Dressing Aquacel Foam   Dressing Changed Changed   Dressing Status Dressing Changed;Removed; Old drainage   Wound Length (cm) 2.5 cm   Wound Width (cm) 2 cm   Wound Surface Area (cm^2) 5 cm^2   Wound Depth (cm) 0.1 cm   Wound Volume (cm^3) 0.5 cm^3   Rita-wound Assessment Clean;Fragile; Moist   Wound Granulation Tissue Red   Wound Odor None   Pressure Injury Stage 2   State of Healing Fully granulated   [REMOVED] Wound 01/27/23 Pressure Injury Buttocks Right   Final Assessment Date: 02/02/23  Date First Assessed/Time First Assessed: 01/27/23 2300   Present on Hospital Admission: Yes  Primary Wound Type: Pressure Injury  Location: Buttocks  Wound Location Orientation: Right  Wound Description (Comments): Rig. .. Wound Image    Site Assessment Clean;Dry; Intact;Fragile;Pink;Epithelialization   Closure Approximated   Drainage Amount None   Treatments Cleansed   Dressing Aquacel Foam   Dressing Changed Reinforced   Dressing Status Clean;Dry; Intact   Wound Depth (cm) 0 cm   Non-staged Wound Description Not applicable   Wound Odor None   State of Healing Epithelialized   Wound 02/01/23 Pressure Injury Heel Left;Lateral   Date First Assessed/Time First Assessed: 02/01/23 0900   Present on Hospital Admission: Yes  Primary Wound Type: Pressure Injury  Location: Heel  Wound Location Orientation: Left;Lateral   Wound Image    Site Assessment Black; Brown;Fragile; Moist;Painful   Closure Not approximated   Drainage Amount Scant   Drainage Description Brown;Serosanguineous   Treatments Betadine   Dressing 4x4s;Aquacel Foam;Kerlix roll   Dressing Changed Changed   Dressing Status Dressing Changed;Removed; Old drainage   Wound Length (cm) 4.8 cm   Wound Width (cm) 4.8 cm   Wound Surface Area (cm^2) 23.04 cm^2   Rita-wound Assessment Dry;Fragile   Wound Bed Eschar (%) 100 %   Wound Odor None   Pressure Injury Stage U   State of Healing Eschar   Wound Follow Up   Follow up needed Yes     WOUND CARE NOTE    History:  Past Medical History:   Diagnosis Date    A-fib (HCC)     Anemia     BPH (benign prostatic hyperplasia)     CKD (chronic kidney disease)     Congestive heart disease (HCC)     Constipation     Dementia (HCC)     Falls     Glaucoma     Muscle weakness     Problems with swallowing     Renal disorder      History reviewed. No pertinent surgical history. Social History     Socioeconomic History    Marital status: Single   Tobacco Use    Smoking status: Never    Smokeless tobacco: Never   Vaping Use    Vaping Use: Never used   Substance and Sexual Activity    Alcohol use: Not Currently    Drug use: Never     PLAN   Recommendations:  Dietary consult for recommendations for nutrition to optimize wound healing  Turn schedules  Heels elevated using pillows, heel wedge or heel boots to offload heels  To prevent sliding: decrease head of bed and elevate foot of bed as medical condition tolerates  Prompt incontinence care  Moisture barrier for incontinence. May consider SENSICARE    Wound(s)  Location: LEFT HEEL  Topical BETADINE  Dressings DRY GAUZE  Secure with FOAM HEEL CUP AND KERLIX ROLL  Frequency DAILY    Location: SACRUM  Cleansing  Pamper wipes  Topical SENSICARE  Dressings BORDERED FOAM  Frequency Q 12 H AND PRN     SUBJECTIVE   HELLO    OBJECTIVE   RN Consult new  Reason for Consult \"LEFT HEEL\"      ASSESSMENT   Jordan Score:  Jordan Scale Score: 12    Chart Reviewed: yes    Wound(s):  Pt seen with bedside RN for wound assessment.  The left lateral heel has an unstageable ulcer with eschar, recommend painting w betadine daily w dry dressing, foam heel cup. The sacrum has two open stg 2 ulcers, clean. Recommend application of moisture barrier and protect with bordered foam. The right hip has no wound, new epithelialized tissue present, protected w foam dressing. Pillow placed under the right hip and between knees, under legs to elevate heels off of mattress. Allergies: Patient has no known allergies. Labs:   Lab Results   Component Value Date    WBC 4.6 02/02/2023    HGB 7.8 (L) 02/02/2023    HCT 24.3 (L) 02/02/2023    .0 02/02/2023    CREATSERUM 2.16 (H) 02/02/2023    BUN 52 (H) 02/02/2023     02/02/2023    K 4.5 02/02/2023     (H) 02/02/2023    CO2 25.0 02/02/2023    GLU 83 02/02/2023    CA 8.1 (L) 02/02/2023    ALB 2.0 (L) 02/02/2023    ALKPHO 72 02/01/2023    BILT 0.2 02/01/2023    TP 6.2 (L) 02/01/2023    AST 48 (H) 02/01/2023    ALT 28 02/01/2023    MG 2.0 02/01/2023    PHOS 2.8 02/02/2023     No results found for: PREALBUMIN      Time Spent 30 Minutes.     Frannie Brooks, RN, BSN, [address] [facility]  [phone]

## 2023-02-02 NOTE — PLAN OF CARE
Problem: Safety Risk - Non-Violent Restraints  Goal: Patient will remain free from self-harm  Description: INTERVENTIONS:  - Apply the least restrictive restraint to prevent harm  - Notify patient and family of reasons restraints applied  - Assess for any contributing factors to confusion (electrolyte disturbances, delirium, medications)  - Discontinue any unnecessary medical devices as soon as possible  - Assess the patient's physical comfort, circulation, skin condition, hydration, nutrition and elimination needs   - Reorient and redirection as needed  - Assess for the need to continue restraints  2/2/2023 0615 by Sam Adams, RN  Outcome: Progressing  2/2/2023 0149 by Sam Adams, RN  Outcome: Progressing

## 2023-02-02 NOTE — DISCHARGE SUMMARY
[facility]    Discharge Summary    Marialuisa Ceja Patient Status:  Inpatient    1933 MRN [id number]   Location [facility] 2W/SW Attending Renato Jones MD   Hosp Day # 6 PCP No primary care provider on file. Date of Admission: 2023   Date of Discharge: 2023    Admitting Diagnosis: Hypernatremia [E87.0]  Elevated troponin [R77.8]  ABELARDO (acute kidney injury) ([address] [N17.9]  Acute cystitis with hematuria [N30.01]  Hypothermia, initial encounter [T68. XXXA]    Disposition: Home    Discharge Diagnosis: . Principal Problem:    Hypothermia, initial encounter  Active Problems:    ABELARDO (acute kidney injury) ([address]    Hypernatremia    Elevated troponin    Acute cystitis with hematuria      Hospital Course:   Reason for Admission: ABELARDO    Discharge Physical Exam: General appearance: alert, appears stated age and cooperative  Pulmonary:  clear to auscultation bilaterally  Cardiovascular: S1, S2 normal, no murmur, click, rub or gallop, regular rate and rhythm, S1, S2 normal  Abdominal: soft, non-tender; bowel sounds normal; no masses,  no organomegaly  Extremities: extremities normal, atraumatic, no cyanosis or edema  Pulses: 2+ and symmetric  Skin: Skin color, texture, turgor normal. No rashes or lesions    Hospital Course: Pt doing better DC home    Complications: None    Consultants     Provider Role Specialty    Queta Man MD Consulting Physician  Cuco Schroeder MD Consulting Physician  INFECTIOUS DISEASES     Hoa Gibson MD Consulting Physician  Suman Basilio MD Consulting Physician  INFECTIOUS DISEASES     Janene Nielsen MD Consulting Physician  Interventional, Cardiology           Pending Labs     Order Current Status    Urine Culture, Routine Preliminary result          Discharge Plan:   Discharge Condition: Stable    Current Discharge Medication List    New Orders    midodrine 5 MG Oral Tab  Take 1 tablet (5 mg total) by mouth in the morning and 1 tablet (5 mg total) at noon and 1 tablet (5 mg total) in the evening. Home Meds - Unchanged    acetaminophen 325 MG Oral Tab  Take 650 mg by mouth every 4 (four) hours as needed for Pain. mineral oil-hydrophilic petrolatum External Ointment  Apply topically as needed for Dry Skin. Cholecalciferol 50 MCG (2000 UT) Oral Cap  Take 1 tablet by mouth daily. dorzolamide-timolol 22.3-6.8 MG/ML Ophthalmic Solution  Place 1 drop into both eyes 2 (two) times daily. ferrous sulfate 325 (65 FE) MG Oral Tab EC  Take 325 mg by mouth daily with breakfast.    finasteride 5 MG Oral Tab  Take 5 mg by mouth daily. latanoprost 0.005 % Ophthalmic Solution  Place into both eyes nightly. sennosides-docusate 8.6-50 MG Oral Tab  Take 1 tablet by mouth nightly. Hold for loose stools. tamsulosin 0.4 MG Oral Cap  Take 0.8 mg by mouth nightly. thiamine 100 MG Oral Tab  Take 100 mg by mouth daily. 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          Tyrone Richard MD, Kai Cisneros MD  2/2/2023

## 2023-02-03 ENCOUNTER — PATIENT OUTREACH (OUTPATIENT)
Dept: CASE MANAGEMENT | Age: 88
End: 2023-02-03

## 2023-02-03 NOTE — PROGRESS NOTES
TCM chart review. No TCM as patient follows with outside [facility] PCP. Encounter closing. Sarecycline Counseling: Patient advised regarding possible photosensitivity and discoloration of the teeth, skin, lips, tongue and gums.  Patient instructed to avoid sunlight, if possible.  When exposed to sunlight, patients should wear protective clothing, sunglasses, and sunscreen.  The patient was instructed to call the office immediately if the following severe adverse effects occur:  hearing changes, easy bruising/bleeding, severe headache, or vision changes.  The patient verbalized understanding of the proper use and possible adverse effects of sarecycline.  All of the patient's questions and concerns were addressed.

## 2023-02-07 ENCOUNTER — PATIENT OUTREACH (OUTPATIENT)
Dept: CASE MANAGEMENT | Age: 88
End: 2023-02-07

## 2023-02-07 NOTE — PROGRESS NOTES
VM received; pt daughter, Janny Media requesting assistance not sure her father can go into the office (dc 02/02)    Dr Ananth Luque  [address] [place] [address]  [phone]  Follow up 1-2 weeks

## 2023-02-08 NOTE — PROGRESS NOTES
Dr Austen Arroyo  [address]  [address] [address]  [phone]  Follow up 1-2 weeks  Voiding trial   Apt: Feb 16 @9am -cath removal w/ Nurse   @2:45 w/ Dr. Tatyana Wilson pt dtr healthy driven Jey Rice #: [phone]  And advised pt dtr to call ins to ask about covered transportation options    Pt dtr verbalized understanding  Closing encounter Will repeat echocardiogram 1 year from previous. Writer RN replied to patient e-advice with plan of care after d/w provider. Echocardiogram order entered.     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 This is something we will continue to monitor and plan for repeat echocardiogram in year for further evaluation. You have a follow up office visit in may as well and we can discuss plan of care at that time too. Please let me know if you have any additional questions.     Thank you,   Lela GAVIN     ===View-only below this line===      ----- Message -----     From: Richard Herrera     Sent: 2/5/2019  3:47 PM CST       To: Main Miller MD  Subject: Lab Test or Test Related Question    One of the scans taken on or about the time of my stress test showed a bulge in my descending aorta.  I don’t remember the measurement of this and was unable to locate it.  I would like that info for my medical history.  Thanks.  Richard

## (undated) NOTE — IP AVS SNAPSHOT
[facility]            (For Outpatient Use Only) Initial Admit Date: 2023   Inpt/Obs Admit Date: Inpt: 23 / Obs: N/A   Discharge Date:    Sagrario Jones:  [de-identified]   MRN: [de-identified]   CSN: [id number]   CEID: [id number]        ENCOUNTER  Patient Class: Inpatient Admitting Provider: Tamera Butterfield MD Unit: [address] [place] [address]   Hospital Service: ICU Attending Provider: Tamera Butterfield MD   Bed: 220-A   Visit Type:   Referring Physician: No ref. provider found Billing Flag:    Admit Diagnosis: Hypernatremia [E87.0]      PATIENT  Legal Name:   Sharlene Goldmann    Legal Sex: Male  Gender ID:              Pref Name:    PCP:   Home: [phone]   Address:  [facility] [address]: 1933 (89 yrs) Mobile: [phone]         City/State/Zip: [facility] Marital: Single Language: [address] [place] [address] Drive: [place] SSN4: [id number] Lutheran: None     Race: Other Ethnicity: Non  Or [address] CONTACT   Name Relationship Legal Guardian? Home Phone Work Phone Mobile Phone   1. Jamee Hernandes  2.  Tamera Butterfield Daughter  OTHER   No       [phone]     GUARANTOR  Guarantor: [facility] : 1933 Home Phone: [phone]   Address: [address] [facility] [address]  Sex: Male Work Phone:    City/State/Zip: [facility]   Rel. to Patient: Self Guarantor ID: [id number]   [address]   Employer:  Status: NOT EMPLO*     COVERAGE  PRIMARY INSURANCE   Payor: MEDICARE Plan: MEDICARE PART A&B   Group Number:  Insurance Type: INDEMNITY   Subscriber Name: Kenneth Oritz : 1933   Subscriber ID: [id number] Pt Rel to Subscriber: Self   SECONDARY INSURANCE   Payor:  Plan:    Group Number:  Insurance Type:    Subscriber Name:  Subscriber :    Subscriber ID:  Pt Rel to Subscriber:    TERTIARY INSURANCE   Payor:  Plan:    Group Number:  Insurance Type:    Subscriber Name:  Subscriber :    Subscriber ID:  Pt Rel to Subscriber:    Hospital Account Financial Class: Medicare    February 2, 2023